# Patient Record
Sex: FEMALE | Race: WHITE
[De-identification: names, ages, dates, MRNs, and addresses within clinical notes are randomized per-mention and may not be internally consistent; named-entity substitution may affect disease eponyms.]

---

## 2017-02-10 ENCOUNTER — HOSPITAL ENCOUNTER (EMERGENCY)
Dept: HOSPITAL 25 - UCEAST | Age: 47
Discharge: HOME | End: 2017-02-10
Payer: COMMERCIAL

## 2017-02-10 VITALS — DIASTOLIC BLOOD PRESSURE: 84 MMHG | SYSTOLIC BLOOD PRESSURE: 135 MMHG

## 2017-02-10 DIAGNOSIS — F17.210: ICD-10-CM

## 2017-02-10 DIAGNOSIS — M77.12: Primary | ICD-10-CM

## 2017-02-10 DIAGNOSIS — M77.11: ICD-10-CM

## 2017-02-10 PROCEDURE — G0463 HOSPITAL OUTPT CLINIC VISIT: HCPCS

## 2017-02-10 PROCEDURE — 99213 OFFICE O/P EST LOW 20 MIN: CPT

## 2017-02-10 NOTE — UC
Elbow Pain





- HPI Summary


HPI Summary: 


1 WEEK OF BILATERAL ELBOW PAIN, RIGHT>LEFT. PT IS RIGHT HANDED AND WORKS IN THE 

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 

ACTIVITIES. PAIN IS KEEPING HER UP AT NIGHT. SHE CAN NOT GET COMFORTABLE. 

1200MG IBUPROFEN AND 1000MF NAPROXEN NOT HELPING. ICE NOT HELPING. DENIES ANY 

TRAUMA OR INJURY.





- History of Current Complaint


Chief Complaint: UCLowerExtremity


Stated Complaint: ELBOW PAIN


Time Seen by Provider: 02/10/17 10:03


Hx Obtained From: Patient


Hx Last Menstrual Period: 4/20/14


Onset/Duration: Atraumatic


Severity Initially: Moderate


Severity Currently: Moderate


Pain Intensity: 10 - STATES 10/10 RIGHT ELBOW PAIN BUT SITTING IN EXAM ROOM IN 

NO ACUTE DISTRESS


Pain Scale Used: 0-10 Numeric


Location Of Pain: Is Discrete @ - BILATERAL ELBOWS R>L


Character: Aching


Aggravating Factor(s): Movement


Alleviating Factor(s): Nothing


Associated Signs And Symptoms: Negative: Swelling, Redness, Bruising, Fever, 

Weakness, Numbness/Tingling





- Allergies/Home Medications


Allergies/Adverse Reactions: 


 Allergies











Allergy/AdvReac Type Severity Reaction Status Date / Time


 


ENVIRONMENTAL Allergy  Unknown Uncoded 02/10/17 09:18





   Reaction  





   Details  











Home Medications: 


 Home Medications





Amphetamine-Dextroamphetamine [Adderall 10 mg-] 20 mg PO DAILY 02/10/17 [

History Confirmed 02/10/17]


Ibuprofen [Ibuprofen 200 MG] 1,200 mg PO PRN 02/10/17 [History]


Montelukast Sodium TAB* [Singulair 5 mg TAB*] 5 mg PO DAILY 02/10/17 [History 

Confirmed 02/10/17]


Multiple Vitamins W/ Minerals [Multivitamin Adult] 1 tab PO DAILY 02/10/17 [

History Confirmed 02/10/17]


Naproxen [Naproxen 500 MG TABS] 1,000 mg PO PRN 02/10/17 [History]











PMH/Surg Hx/FS Hx/Imm Hx





- Additional Past Medical History


Additional PMH: 





Chronic Fatigue;  Osteoarthritis;  Fibromylagia; LASHAY


Endocrine History Of: Reports: Thyroid Disease - Hypothyroidism, Hypothyroidism


   Denies: Diabetes


Cardiovascular History Of: Reports: Deep Vein Thrombosis


   Denies: Hypertension, Pacemaker/ICD


GI/ History Of: Reports: Gastroesophageal Reflux


   Denies: Renal Disease


Psychological History Of: Reports: Anxiety, Depression


Cancer History Of: 


   Denies: Breast Cancer





- Surgical History


Surgical History: Yes


Surgery Procedure, Year, and Place: 1996 TUBAL LIGATION, [facility].  1995 RIGHT BREAST BIOPSY, [place], [place].  Tonsillectomy/Adenoidectomy 

age 5.  CYST REMOVED FRON OUTER EAR AGE 5.  Partial Hysterectomy





- Family History


Known Family History: Positive: Hypertension





- Social History


Alcohol Use: None


Substance Use Type: None


Smoking Status (MU): Heavy Every Day Tobacco Smoker


Type: Cigarettes


Amount Used/How Often: 1/2 ppd


Length of Time of Smoking/Using Tobacco: 20 years


Have You Smoked in the Last Year: Yes





- Immunization History


Most Recent Influenza Vaccination: Not Up to date





Review of Systems


Constitutional: Negative


Skin: Negative


Respiratory: Negative


Cardiovascular: Negative


Gastrointestinal: Negative


Musculoskeletal: Arthralgia


All Other Systems Reviewed And Are Negative: Yes





Physical Exam


Triage Information Reviewed: Yes


Appearance: Well-Appearing, No Pain Distress, Well-Nourished


Vital Signs: 


 Initial Vital Signs











Temp  99.2 F   02/10/17 09:26


 


Pulse  78   02/10/17 09:26


 


Resp  16   02/10/17 09:26


 


BP  135/84   02/10/17 09:26


 


Pulse Ox  98   02/10/17 09:26











Vital Signs Reviewed: Yes


Eyes: Positive: Conjunctiva Clear


ENT: Positive: Hearing grossly normal


Neck: Positive: Supple


Respiratory: Positive: No respiratory distress, No accessory muscle use


Cardiovascular: Positive: Pulses Normal


Abdomen Description: Positive: Soft


Musculoskeletal: Positive: ROM Intact, No Edema, Other: - TTP BILATERAL LATERAL 

EPICONDYLES, RIGHT>LEFT


Neurological: Positive: Alert


Psychological: Positive: Age Appropriate Behavior


Skin: Negative: rashes





Elbow Pain Course/Dx





- Course


Course Of Treatment: SLING, NSAIDS, COUNTERFORCE BRACE, REST. FOLLOW-UP ORTHO 

IF NEEDED.





- Differential Dx/Diagnosis


Differential Diagnosis/HQI/PQRI: Bursitis, Fracture (Closed), Infection, Sprain

, Strain


Provider Diagnoses: LATERAL EPICONDYLITIS





Discharge





- Discharge Plan


Condition: Stable


Disposition: HOME


Prescriptions: 


Hydrocodone-Acetaminophen [Lorcet 5-325 mg] 1 tab PO QID PRN #15 tab MDD 4


 PRN Reason: Pain


Patient Education Materials:  Tennis Elbow Exercises (GEN), Tennis Elbow (ED)


Forms:  *Work Release


Referrals: 


Desi Rebollar MD [Primary Care Provider] - If Needed


Jesse Draper MD [Medical Doctor] - If Needed


Additional Instructions: 


THIS IS AN OVERUSE REPETITIVE MOTION INJURY AND CAN BE A CHRONIC CONDITION. TRY 

TO REST YOUR ARM AS MUCH AS POSSIBLE AND AVOID REPETITIVE MOTION AND ANY 

ACTIVITIES THAT CAUSE DISCOMFORT. TRY USING A COUNTERFORCE BRACE TO HELP WITH 

SYMPTOMS. YOU CAN GET THIS AT MOST PHARMACIES. THE SLING IS JUST FOR YOU TO USE 

AS NEEDED FOR COMFORT. TAKE IBUPROFEN OR NAPROXEN, DO NOT EXCEED DOSING AS 

STATED BELOW.





IBUPROFEN MAX DOSE: 600MG (3 TABS) EVERY 6 HRS OR 800MG (4 TABS) EVERY 8 HRS


OR


NAPROXEN MAX DOSE: 440MG (2 TABS) EVERY 12 HRS (OTC) or 500MG EVERY 12 HOURS (

PRESCRIPTION)





IF YOUR SYMPTOMS DO NOT IMPROVE WITH CONSERVATIVE MEASURES YOU MAY BENEFIT FROM 

AN INJECTION. CALL ORTHO IF NEEDED.

## 2019-06-13 ENCOUNTER — HOSPITAL ENCOUNTER (OUTPATIENT)
Dept: HOSPITAL 25 - OREAST | Age: 49
Discharge: HOME | End: 2019-06-13
Attending: ORTHOPAEDIC SURGERY
Payer: COMMERCIAL

## 2019-06-13 VITALS — DIASTOLIC BLOOD PRESSURE: 86 MMHG | SYSTOLIC BLOOD PRESSURE: 151 MMHG

## 2019-06-13 DIAGNOSIS — F17.210: ICD-10-CM

## 2019-06-13 DIAGNOSIS — M18.12: Primary | ICD-10-CM

## 2019-06-13 DIAGNOSIS — F41.8: ICD-10-CM

## 2019-06-13 DIAGNOSIS — Z86.718: ICD-10-CM

## 2019-06-13 DIAGNOSIS — E27.40: ICD-10-CM

## 2019-06-13 DIAGNOSIS — E03.9: ICD-10-CM

## 2019-06-13 DIAGNOSIS — G47.33: ICD-10-CM

## 2019-06-13 DIAGNOSIS — K21.9: ICD-10-CM

## 2019-06-13 PROCEDURE — C1713 ANCHOR/SCREW BN/BN,TIS/BN: HCPCS

## 2019-06-13 PROCEDURE — 88304 TISSUE EXAM BY PATHOLOGIST: CPT

## 2019-06-13 PROCEDURE — 88311 DECALCIFY TISSUE: CPT

## 2019-06-13 NOTE — OP
DATE OF OPERATION:  06/13/19 - [facility]

 

DATE OF BIRTH:  [birth year]

 

SURGEON:  Jesse Glover MD

 

ASSISTANT:  SYED Morton.  An assistant was needed for the procedure to aid 
in positioning of the arm and retraction.

 

ANESTHESIOLOGIST:  Dr. Sandoval.

 

ANESTHESIA:  General.

 

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

 

POST-OP DIAGNOSIS:  Left thumb stage 3 basal joint arthritis.

 

OPERATIVE PROCEDURE:

1.  Left thumb carpometacarpal arthroplasty with trapeziectomy.

2.  Left distally based flexor carpi radialis tendon transfer for thumb 
suspension and tendon interposition.

 

INDICATIONS:  Carmenza has stage 3 basal joint arthritis.  It is very symptomatic.  
We talked about her treatment options.  She wanted to proceed with surgery.  
She understands the risks associated with any procedure.

 

ESTIMATED BLOOD LOSS:  5 mL.

 

COMPLICATIONS:  None.

 

FINDINGS:  See above and below.

 

DESCRIPTION OF PROCEDURE:  Carmenza was seen in the preoperative holding area.  The 
correct site, side, and procedure were identified.  We came back to the 
operating room where the arm was prepped and draped in the usual fashion and a 
time-out was performed.

 

I began by making a longitudinal incision over the dorsoradial thumb base. 
Dissection was carried down through the subcutaneous tissue and fascia.  The 
radial artery was mobilized and retracted out of the way.  Longitudinal 
capsulotomy and subperiosteal flap was raised to expose the entirety of the 
trapezium.  Once the soft tissue was released from the margin of the trapezium, 
this was excised in piecemeal fashion.  The FCR tendon was preserved in the 
base of the wound.  Once the entirety of the trapezium was excised, I went 
ahead and made a bone tunnel from the dorsoradial aspect of the thumb 
metacarpal base exiting out the volar ulnar aspect of the articular surface 
near the base of the second metacarpal.  This was done with sequentially larger 
drill bits.  The wound was then irrigated out and we turned our attention to 
harvesting the tendon.

 

I went ahead and made a transverse 1 cm incision just proximal to the wrist 
flexion crease of the FCR tendon.  The tendon sheath was opened.  The tendon 
was delivered up into the wound. I released the tendon along the sheath. I made 
a couple more 1 cm transverse incisions along the course of the tendon.  Tendon 
was released at the musculotendinous junction and pulled down into the distal 
wound.  The end of the tendon was cleaned of its muscular remnants and the tail 
of the tendon was secured with 3-0 Ethibond suture to prevent fraying.  The 
tendon tail was passed down into the thumb base wound.  The tendon was split 
and half the tendon was taken through a bone tunnel, the bone tunnel previously 
made.  I had placed a mini Mitek suture anchor in the base of the second 
metacarpal.  The suture from that was used to tie my tendon transfer down to 
the base of the second metacarpal right near the sling that held the thumb up 
in suspension.  The remainder of the tendon tail was rolled up as a ball and it 
was docked as an interposition between the base of the metacarpal and the 
scaphoid.  The scaphotrapezoid joint was inspected and looked healthy.  At this 
point, the tendon transfer complete, everything was looking very good, the 
thumb was nicely suspended.  We irrigated out the wound.  The capsule was 
closed with 4-0 Vicryl suture. The skin was closed with 4-0 nylon suture. 0.25% 
plain Marcaine was infiltrated all around the operative area.  The patient was 
woken up and taken to the recovery room in stable condition.

 

 [id number]/CPS #: [id number]

BLOSSOM